# Patient Record
Sex: MALE | Race: WHITE
[De-identification: names, ages, dates, MRNs, and addresses within clinical notes are randomized per-mention and may not be internally consistent; named-entity substitution may affect disease eponyms.]

---

## 2018-03-01 ENCOUNTER — HOSPITAL ENCOUNTER (EMERGENCY)
Dept: HOSPITAL 17 - NEPD | Age: 23
Discharge: LEFT BEFORE DISCHARGE | End: 2018-03-01
Payer: SELF-PAY

## 2018-03-01 VITALS — HEIGHT: 68 IN | BODY MASS INDEX: 21.72 KG/M2 | WEIGHT: 143.3 LBS

## 2018-03-01 VITALS
DIASTOLIC BLOOD PRESSURE: 79 MMHG | HEART RATE: 117 BPM | RESPIRATION RATE: 18 BRPM | TEMPERATURE: 98.4 F | OXYGEN SATURATION: 96 % | SYSTOLIC BLOOD PRESSURE: 107 MMHG

## 2018-03-01 DIAGNOSIS — Z53.21: ICD-10-CM

## 2018-03-01 DIAGNOSIS — S61.219A: Primary | ICD-10-CM
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
## 2018-03-01 LAB
ALBUMIN SERPL-MCNC: 5 GM/DL (ref 3.4–5)
ALP SERPL-CCNC: 183 U/L (ref 45–117)
ALT SERPL-CCNC: 32 U/L (ref 12–78)
AST SERPL-CCNC: 23 U/L (ref 15–37)
BASOPHILS # BLD AUTO: 0.1 TH/MM3 (ref 0–0.2)
BASOPHILS NFR BLD: 0.7 % (ref 0–2)
BILIRUB SERPL-MCNC: 2.7 MG/DL (ref 0.2–1)
BUN SERPL-MCNC: 24 MG/DL (ref 7–18)
CALCIUM SERPL-MCNC: 10.5 MG/DL (ref 8.5–10.1)
CHLORIDE SERPL-SCNC: 99 MEQ/L (ref 98–107)
CREAT SERPL-MCNC: 1.23 MG/DL (ref 0.6–1.3)
EOSINOPHIL # BLD: 0.1 TH/MM3 (ref 0–0.4)
EOSINOPHIL NFR BLD: 1 % (ref 0–4)
ERYTHROCYTE [DISTWIDTH] IN BLOOD BY AUTOMATED COUNT: 14 % (ref 11.6–17.2)
GFR SERPLBLD BASED ON 1.73 SQ M-ARVRAT: 72 ML/MIN (ref 89–?)
GLUCOSE SERPL-MCNC: 226 MG/DL (ref 74–106)
HCO3 BLD-SCNC: 25.5 MEQ/L (ref 21–32)
HCT VFR BLD CALC: 43.1 % (ref 39–51)
HGB BLD-MCNC: 14.9 GM/DL (ref 13–17)
LYMPHOCYTES # BLD AUTO: 1.4 TH/MM3 (ref 1–4.8)
LYMPHOCYTES NFR BLD AUTO: 14 % (ref 9–44)
MCH RBC QN AUTO: 30.5 PG (ref 27–34)
MCHC RBC AUTO-ENTMCNC: 34.5 % (ref 32–36)
MCV RBC AUTO: 88.4 FL (ref 80–100)
MONOCYTE #: 0.8 TH/MM3 (ref 0–0.9)
MONOCYTES NFR BLD: 7.6 % (ref 0–8)
NEUTROPHILS # BLD AUTO: 7.7 TH/MM3 (ref 1.8–7.7)
NEUTROPHILS NFR BLD AUTO: 76.7 % (ref 16–70)
PLATELET # BLD: 365 TH/MM3 (ref 150–450)
PMV BLD AUTO: 6.9 FL (ref 7–11)
PROT SERPL-MCNC: 9.6 GM/DL (ref 6.4–8.2)
RBC # BLD AUTO: 4.88 MIL/MM3 (ref 4.5–5.9)
SODIUM SERPL-SCNC: 134 MEQ/L (ref 136–145)
WBC # BLD AUTO: 10 TH/MM3 (ref 4–11)

## 2018-03-01 PROCEDURE — 80053 COMPREHEN METABOLIC PANEL: CPT

## 2018-03-01 PROCEDURE — 99281 EMR DPT VST MAYX REQ PHY/QHP: CPT

## 2018-03-01 PROCEDURE — 85025 COMPLETE CBC W/AUTO DIFF WBC: CPT

## 2018-03-01 PROCEDURE — 83605 ASSAY OF LACTIC ACID: CPT

## 2018-03-01 PROCEDURE — 87040 BLOOD CULTURE FOR BACTERIA: CPT

## 2018-04-10 NOTE — PD
Physical Exam


Date Seen by Provider:  Mar 1, 2017


Time Seen by Provider:  12:00


Narrative


22-year-old male previously triaged, with protocol labs ordered by the triage 

nurse, was placed in the room, with complaints of laceration to left thumb with 

localized cellulitis and abscess.  When I went to the room, patient had already 

eloped, and was deemed an AMA.  I never actually saw the patient.





Data


Data


Orders





 Orders


Complete Blood Count With Diff (3/1/18 12:33)


Comprehensive Metabolic Panel (3/1/18 12:33)


Blood Culture (3/1/18 12:33)


Lactic Acid (3/1/18 12:33)





Labs





Laboratory Tests








Test


  3/1/18


13:00


 


White Blood Count 10.0 TH/MM3 


 


Red Blood Count 4.88 MIL/MM3 


 


Hemoglobin 14.9 GM/DL 


 


Hematocrit 43.1 % 


 


Mean Corpuscular Volume 88.4 FL 


 


Mean Corpuscular Hemoglobin 30.5 PG 


 


Mean Corpuscular Hemoglobin


Concent 34.5 % 


 


 


Red Cell Distribution Width 14.0 % 


 


Platelet Count 365 TH/MM3 


 


Mean Platelet Volume 6.9 FL 


 


Neutrophils (%) (Auto) 76.7 % 


 


Lymphocytes (%) (Auto) 14.0 % 


 


Monocytes (%) (Auto) 7.6 % 


 


Eosinophils (%) (Auto) 1.0 % 


 


Basophils (%) (Auto) 0.7 % 


 


Neutrophils # (Auto) 7.7 TH/MM3 


 


Lymphocytes # (Auto) 1.4 TH/MM3 


 


Monocytes # (Auto) 0.8 TH/MM3 


 


Eosinophils # (Auto) 0.1 TH/MM3 


 


Basophils # (Auto) 0.1 TH/MM3 


 


CBC Comment DIFF FINAL 


 


Differential Comment  


 


Blood Urea Nitrogen 24 MG/DL 


 


Creatinine 1.23 MG/DL 


 


Random Glucose 226 MG/DL 


 


Total Protein 9.6 GM/DL 


 


Albumin 5.0 GM/DL 


 


Calcium Level 10.5 MG/DL 


 


Alkaline Phosphatase 183 U/L 


 


Aspartate Amino Transf


(AST/SGOT) 23 U/L 


 


 


Alanine Aminotransferase


(ALT/SGPT) 32 U/L 


 


 


Total Bilirubin 2.7 MG/DL 


 


Sodium Level 134 MEQ/L 


 


Potassium Level 4.1 MEQ/L 


 


Chloride Level 99 MEQ/L 


 


Carbon Dioxide Level 25.5 MEQ/L 


 


Anion Gap 10 MEQ/L 


 


Estimat Glomerular Filtration


Rate 72 ML/MIN 


 


 


Lactic Acid Level 2.0 mmol/L 











[facility]


Medical Record Reviewed:  Yes


Supervised Visit with JUAN CARLOS:  Yes


Narrative Course


22-year-old male previously triaged, with protocol labs ordered by the triage 

nurse, was placed in the room, with complaints of laceration to left thumb with 

localized cellulitis and abscess.  When I went to the room, patient had already 

eloped, and was deemed an AMA.  I never actually saw the patient.


Patient Instructions:  General Instructions


Departure Forms:  Tests/Procedures


Disposition:  07 AGAINST MEDICAL ADVICE


Condition:  Stable











Nawaf Silver Apr 10, 2018 14:04